# Patient Record
(demographics unavailable — no encounter records)

---
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
## 2025-01-24 NOTE — DISCUSSION/SUMMARY
[With Me] : with me [___ Month(s)] : in [unfilled] month(s) [FreeTextEntry1] :  Chest discomfort: Chest discomfort has resolved and original symptoms lacked anginal features; however, a troponin was minimally elevated.  I recommend an ischemia evaluation and asked her to return for exercise ECG stress test.  Note: Today's ECG appears normal.  Palpitations: Palpitations occur during periods of increased stress and anxiety.  No arrhythmia on today's ECG.  I recommend echocardiography to assess for structural abnormality--can pursue amatory ECG monitoring if palpitations increase in frequency or severity.  Elevated blood pressure in the absence of established hypertension: Multiple elevated blood pressure measurements in her EMR, suggestive of hypertension.  However, she says that blood pressure is normally "much lower" when checked in other settings.  I asked her to monitor blood pressure at work and to contact me if measurements are high.  We also discussed the importance of weight loss, low-sodium diet, and exercise.  Liver transaminitis: Significant elevation of AST and ALT--I asked her to follow-up with her gastroenterologist.  Asthma: Mild/intermittent; stable.

## 2025-01-24 NOTE — REVIEW OF SYSTEMS
[Weight Gain (___ Lbs)] : [unfilled] ~Ulb weight gain [Chest Discomfort] : no chest discomfort [Palpitations] : palpitations [Under Stress] : under stress [Negative] : Heme/Lymph [FreeTextEntry5] : See HPI

## 2025-01-24 NOTE — PHYSICAL EXAM
[No Acute Distress] : no acute distress [Obese] : obese [Normal S1, S2] : normal S1, S2 [No Murmur] : no murmur [Clear Lung Fields] : clear lung fields [Normal Gait] : normal gait [Gait - Sufficient for Exercise Testing] : gait - sufficient for exercise testing [No Edema] : no edema [Alert and Oriented] : alert and oriented [de-identified] : Extraocular muscles intact [de-identified] : Normocephalic [de-identified] : No JVD [de-identified] : Central obesity [de-identified] : Warm, dry [de-identified] : Grossly intact

## 2025-02-21 NOTE — DISCUSSION/SUMMARY
[de-identified] : Pt is a pleasant 52 year old female, with left knee pain secondary to osteoarthritis and post meniscectomy syndrome. A lengthy discussion was held regarding the patient's condition and treatment options including all risks, benefits, prognosis and outcomes of each were discussed in detail.  Recommend PT and weight reduction specialist. Plan for 1-PCP; 2- nutritionist; 3-PT and after PT join a gym to focus on core and hip strengthening. Discussed risks for development of osteoarthritis: 1-women get more OA compared to males; 2-obesity; 3-arthroscopy higher rate of OA than general population. Recommend to try bicycle; treat the person not just the knee, conservative and natural approach and Ibuprofen 800mg TID. A new PT script was provided today. The patient expressed understanding and all questions were answered. The patient will contact me on how she is doing otherwise follow up will be on a PRN basis.

## 2025-02-21 NOTE — DISCUSSION/SUMMARY
[de-identified] : Pt is a pleasant 52 year old female, with left knee pain secondary to osteoarthritis and post meniscectomy syndrome. A lengthy discussion was held regarding the patient's condition and treatment options including all risks, benefits, prognosis and outcomes of each were discussed in detail.  Recommend PT and weight reduction specialist. Plan for 1-PCP; 2- nutritionist; 3-PT and after PT join a gym to focus on core and hip strengthening. Discussed risks for development of osteoarthritis: 1-women get more OA compared to males; 2-obesity; 3-arthroscopy higher rate of OA than general population. Recommend to try bicycle; treat the person not just the knee, conservative and natural approach and Ibuprofen 800mg TID. A new PT script was provided today. The patient expressed understanding and all questions were answered. The patient will contact me on how she is doing otherwise follow up will be on a PRN basis.

## 2025-02-21 NOTE — CONSULT LETTER
[Dear  ___] : Dear  [unfilled], [FreeTextEntry1] : I had the pleasure of evaluating your patient in the office today for left knee pain secondary to osteoarthritis and post meniscectomy syndrome. I have enclosed a copy of today's office notes for your charts and for your review.  Sincerely,   Senthil Flanagan M.D. Professor and  Department of Orthopedic Surgery [facility] [place]

## 2025-02-21 NOTE — CONSULT LETTER
[Dear  ___] : Dear  [unfilled], [FreeTextEntry1] : I had the pleasure of evaluating your patient in the office today for left knee pain secondary to osteoarthritis and post meniscectomy syndrome. I have enclosed a copy of today's office notes for your charts and for your review.  Sincerely,   Senthil Flanagan M.D. Professor and  Department of Orthopedic Surgery [facility] [place]

## 2025-02-21 NOTE — PHYSICAL EXAM
[LE] : Sensory: Intact in bilateral lower extremities [Knee] : patellar 2+ and symmetric bilaterally [PT] : posterior tibial 2+ and symmetric bilaterally [de-identified] : Pt is a pleasant 52 year old female, AAOx3 with no apparent distress, 47.8 BMI. Physical examination of left knee reveals normal contours with no deformity, skin intact, with no signs of infection, no erythema, no swelling, no discoloration, no distal lymphedema, no patholaxity, no muscle atrophy noted. ROM of left knee reveals flex/ext: 0-120 with medial joint TTP. Motor strength 5/5 throughout the arc of motion. No neurological deficits noted. [de-identified] : X-rays were ordered, obtained, and interpreted by me today 2/21/25: 4views of the left knee demonstrate mild joint space narrowing, sclerosis, bone spurs, with no acute fracture or dislocation.  MRI report from [facility] reviewed by me today dated on 12/14/2023 -  Patellofemoral and medial joint Osteoarthritis, with medial and lateral meniscus tears, Popliteal cysts

## 2025-02-21 NOTE — HISTORY OF PRESENT ILLNESS
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 Estrella presents with left pain since 2023. She denies any injury or trauma. She was seen by Dr. Juvenal Estrella and had an MRI done. She was treated conservatively. She states that the pain comes and goes. She states the pain is a 4/10 at this visit, which she takes Motrin. The pain does NOT bother her today and has been better then it was in 2023. It really bothers her when she drives to [place] for 1.5 hours. She denies any numbness or tingling.   Hx: Asthma

## 2025-02-21 NOTE — HISTORY OF PRESENT ILLNESS
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 Estrella presents with left pain since 2023. She denies any injury or trauma. She was seen by Dr. Juvenal Estrella and had an MRI done. She was treated conservatively. She states that the pain comes and goes. She states the pain is a 4/10 at this visit, which she takes Motrin. The pain does NOT bother her today and has been better then it was in 2023. It really bothers her when she drives to [place] for 1.5 hours. She denies any numbness or tingling.   Hx: Asthma

## 2025-02-21 NOTE — PHYSICAL EXAM
[LE] : Sensory: Intact in bilateral lower extremities [Knee] : patellar 2+ and symmetric bilaterally [PT] : posterior tibial 2+ and symmetric bilaterally [de-identified] : Pt is a pleasant 52 year old female, AAOx3 with no apparent distress, 47.8 BMI. Physical examination of left knee reveals normal contours with no deformity, skin intact, with no signs of infection, no erythema, no swelling, no discoloration, no distal lymphedema, no patholaxity, no muscle atrophy noted. ROM of left knee reveals flex/ext: 0-120 with medial joint TTP. Motor strength 5/5 throughout the arc of motion. No neurological deficits noted. [de-identified] : X-rays were ordered, obtained, and interpreted by me today 2/21/25: 4views of the left knee demonstrate mild joint space narrowing, sclerosis, bone spurs, with no acute fracture or dislocation.  MRI report from [facility] [place] reviewed by me today dated on 12/14/2023 -  Patellofemoral and medial joint Osteoarthritis, with medial and lateral meniscus tears, Popliteal cysts